# Patient Record
Sex: MALE | Race: WHITE | NOT HISPANIC OR LATINO | Employment: OTHER | URBAN - METROPOLITAN AREA
[De-identification: names, ages, dates, MRNs, and addresses within clinical notes are randomized per-mention and may not be internally consistent; named-entity substitution may affect disease eponyms.]

---

## 2017-06-30 LAB — HCV AB SER-ACNC: <0.1

## 2023-11-20 LAB — HCV AB SER-ACNC: NORMAL

## 2023-12-01 ENCOUNTER — TELEPHONE (OUTPATIENT)
Age: 73
End: 2023-12-01

## 2023-12-01 NOTE — TELEPHONE ENCOUNTER
Patients GI provider:  SHERYL Dias    Number to return call: [phone]     Reason for call: Pt called and wants to email some records to [facility] for his upcoming appointment.  Advice if he could fax it over but he said he can only doing by email please review and reach out to him to see if he can email it to the office thank you     Scheduled procedure/appointment date if applicable: Appt 66/70/0967

## 2023-12-01 NOTE — TELEPHONE ENCOUNTER
Spoke with Tj Enrique. Unfortunately, we are not able to receive records via E-mail. He is going to attempt to send them Via [employer] as a non-urgent medical Question with the records attached. If unable, he will bring them to his visit on 12-6-23. I also let him know he could contact the previous medical professional(s) to fax their records to us.  ~BS

## 2023-12-04 PROBLEM — B01.9 CHICKEN POX: Status: ACTIVE | Noted: 2023-12-04

## 2023-12-04 PROBLEM — Z11.59 ENCOUNTER FOR HEPATITIS C SCREENING TEST FOR LOW RISK PATIENT: Status: ACTIVE | Noted: 2023-12-04

## 2023-12-04 PROBLEM — Z86.010 PERSONAL HISTORY OF COLONIC POLYPS: Status: ACTIVE | Noted: 2023-12-04

## 2023-12-04 PROBLEM — G43.109 OCULAR MIGRAINE: Status: ACTIVE | Noted: 2023-12-04

## 2023-12-04 PROBLEM — I44.30 AV BLOCK: Status: ACTIVE | Noted: 2023-12-04

## 2023-12-04 PROBLEM — Z86.0100 PERSONAL HISTORY OF COLONIC POLYPS: Status: ACTIVE | Noted: 2023-12-04

## 2023-12-04 PROBLEM — Z95.0 CARDIAC PACEMAKER IN SITU: Status: ACTIVE | Noted: 2023-12-04

## 2023-12-05 ENCOUNTER — OFFICE VISIT (OUTPATIENT)
Dept: GASTROENTEROLOGY | Facility: CLINIC | Age: 73
End: 2023-12-05
Payer: COMMERCIAL

## 2023-12-05 VITALS
BODY MASS INDEX: 23.34 KG/M2 | SYSTOLIC BLOOD PRESSURE: 114 MMHG | HEIGHT: 68 IN | DIASTOLIC BLOOD PRESSURE: 76 MMHG | WEIGHT: 154 LBS

## 2023-12-05 DIAGNOSIS — Z12.11 COLON CANCER SCREENING: ICD-10-CM

## 2023-12-05 DIAGNOSIS — R19.7 DIARRHEA, UNSPECIFIED TYPE: Primary | ICD-10-CM

## 2023-12-05 PROCEDURE — 99203 OFFICE O/P NEW LOW 30 MIN: CPT | Performed by: PHYSICIAN ASSISTANT

## 2023-12-05 RX ORDER — BACITRACIN 500 UNIT/G
OINTMENT (GRAM) TOPICAL EVERY 12 HOURS
COMMUNITY

## 2023-12-05 RX ORDER — DIPHENHYDRAMINE HYDROCHLORIDE 25 MG/1
TABLET ORAL EVERY 12 HOURS
COMMUNITY

## 2023-12-05 RX ORDER — ALBUTEROL SULFATE 90 UG/1
2 AEROSOL, METERED RESPIRATORY (INHALATION) EVERY 6 HOURS PRN
COMMUNITY

## 2023-12-05 NOTE — PROGRESS NOTES
[address] [place] [address] [facility] New Patient Initial Visit  Collins Dupree 75 y.o. male MRN: [id number]  Encounter: [id number]    ASSESSMENT AND PLAN:    1. Diarrhea, unspecified type  Acute, improved  OK to slowly advanced diet to baseline high fiber diet - handout provided  Please call the office or schedule an appointment if symptoms worsen or fail to improve. 2. Colon cancer screening  Last colonoscopy: 1/2019  recall: 10 yr(s)  due: 1/2029     Return if symptoms worsen or fail to improve. Chief Complaint   Patient presents with    Diarrhea       HPI:   Accompanied by self and history is obtained from self. Collins Dupree is a 75y.o. year old male with a PMH significant for colon polyps, AV block s/p pacemaker who presents as a new patient for initial evaluation of diarrhea. HPI    Diarrhea  Ongoing x1 month  Started with runny, light brown stools 11/6  Tried imodium, bland diet without meaningful relief  Sometimes takes probiotic  -15 lb wt loss x1 month  If last meal 13:00, diarrhea will end before midnight  Assoc with nausea 1-3 hrs prior to loose stool  Had vomiting x1 on 11/28  Stool studies ordered by PCP were negative    Last loose stool 11/28  Last BM was last night - small volume, soft, brown  Has gained 6.5 lbs back since last loose stool  No nausea, vomiting, bloating since 11/28    GI History:  Previous issues: None  Blood thinners: ASA: no antiplatelet: no anticoagulation: no  NSAID use: 200 mg once/wk at most  Caffeine use: 1 cup coffee/day, stopped during diarrhea  Tobacco use: 0.25 PPD x10 yrs  EtOH use: quit in 30s, no hx/o heavy use  Cannabis use: none  Insulin use: no    Abdominal Surgical Hx: appendectomy (1981)  Family Hx: Denies first degree relatives with GI malignancies. GI procedure Hx:  EGD:   Colonoscopy: 1/2019, normal; 10 yr recall  GI imaging Hx: CT A/P 11/29/2023: "IMPRESSION:  1.   Possible colitis involving the sigmoid region however detailed evaluation is limited due to lack of distention and adequate evaluation of mural thickness. 2.  Other findings of acute inflammation or abnormal fluid collections. 3.  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 Negative for appetite change, chills and fever. HENT:  Negative for dental problem, mouth sores, sore throat, trouble swallowing and voice change. Respiratory:  Negative for cough and choking. Cardiovascular:  Negative for chest pain and leg swelling. Gastrointestinal:  Positive for abdominal pain, diarrhea, nausea and vomiting. Negative for abdominal distention, anal bleeding, blood in stool, constipation and rectal pain. Genitourinary:  Negative for dysuria, frequency and hematuria. Musculoskeletal:  Negative for arthralgias and myalgias. Skin:  Negative for pallor and rash. Neurological:  Negative for weakness, light-headedness and headaches. Psychiatric/Behavioral:  Negative for confusion and sleep disturbance. The patient is not nervous/anxious. Historical Information   Past Medical History:   Diagnosis Date    AV block 2018    [facility], Karla Martinez [facility]    CAD (coronary artery disease)     Cardiac pacemaker in situ 02/2018    Cecal volvulus (HCC)     Chicken pox 1955    Elevated LFTs     Encounter for hepatitis C screening test for low risk patient 2017    negative    Hyperlipemia     Ocular migraine 2000    intermittent    Personal history of colonic polyps 2001    [facility]    S/P CABG (coronary artery bypass graft)      Past Surgical History:   Procedure Laterality Date    APPENDECTOMY  1981    appendicitis, intermittent cecal volvulus    CARDIAC CATHETERIZATION  05/2019    LAD 80-90% stenosis, Allan Elizabeth    COLONOSCOPY  2019    normal    [facility] TONSILLECTOMY  1954    VASECTOMY  45     Social History     Substance and Sexual Activity   Alcohol Use None     Social History     Substance and Sexual Activity   Drug Use Not on file     Social History     Tobacco Use   Smoking Status Not on file   Smokeless Tobacco Not on file     Family History   Problem Relation Age of Onset    Heart disease Mother 70        bypass x3    Dementia Mother 67    Heart failure Mother 80    Alcohol abuse Father     Stroke Father 68        smoker    Hypertension Sister     Psoriatic Arthritis Sister     Heart disease Sister 67        LAD, RCA, iliac artery stents    Osteogenesis imperfecta Sister     Heart attack Sister 43        again at 46    Heart disease Sister         coronary artery stent    Multiple sclerosis Sister 39    Colon polyps Neg Hx     Colon cancer Neg Hx        Meds/Allergies     Current Outpatient Medications:     B Complex Vitamins (VITAMIN B COMPLEX 100 IJ)    Biotin (Biotin 5000) 5 MG CAPS    COCONUT OIL PO    Misc Natural Products (GLUCOSAMINE CHOND COMPLEX/MSM PO)    Multiple Vitamin (MULTI VITAMIN DAILY PO)    OMEGA 3-6-9 FATTY ACIDS PO    Saw [place] 160 MG CAPS    albuterol (PROVENTIL HFA,VENTOLIN HFA) 90 mcg/act inhaler    No Known Allergies    PHYSICAL EXAM:    Blood pressure 114/76, height 5' 8" (1.727 m), weight 69.9 kg (154 lb). Body mass index is 23.42 kg/m². Physical Exam  Vitals and nursing note reviewed. Constitutional:       General: He is not in acute distress. Appearance: He is not toxic-appearing. HENT:      Head: Normocephalic and atraumatic. Mouth/Throat:      Mouth: Mucous membranes are moist.      Pharynx: Oropharynx is clear. Eyes:      General: No scleral icterus. Extraocular Movements: Extraocular movements intact. Neck:      Trachea: Phonation normal.   Cardiovascular:      Rate and Rhythm: Normal rate and regular rhythm. Heart sounds: No murmur heard. No friction rub. No gallop.    Pulmonary: Effort: Pulmonary effort is normal. No respiratory distress. Breath sounds: No wheezing, rhonchi or rales. Abdominal:      General: Bowel sounds are normal. There is no distension or abdominal bruit. Palpations: Abdomen is soft. There is no hepatomegaly or splenomegaly. Tenderness: There is no abdominal tenderness. There is no guarding or rebound. Musculoskeletal:      Cervical back: Neck supple. Comments: Moving all 4 extremities spontaneously   Skin:     General: Skin is warm and dry. Capillary Refill: Capillary refill takes less than 2 seconds. Coloration: Skin is not jaundiced or pale. Neurological:      General: No focal deficit present. Mental Status: He is alert and oriented to person, place, and time. Psychiatric:         Behavior: Behavior normal. Behavior is cooperative. Thought Content: Thought content normal.         Lab Results:   No results found. Radiology Results:   No results found. Patient expressed understanding and had all questions and concerns addressed. Fina Padilla PA-C  12/05/23  10:16 AM    This chart was completed in part utilizing [employer] speech voice recognition software. Random word insertions, pronoun errors, and incomplete sentences are an occasional consequence of this system due to software limitations, and ambient noise. Any questions or concerns about the content, text, or information contained within the body of this dictation should be directly addressed to the provider for clarification.

## 2023-12-18 ENCOUNTER — TELEPHONE (OUTPATIENT)
Dept: GASTROENTEROLOGY | Facility: CLINIC | Age: 73
End: 2023-12-18

## 2023-12-18 ENCOUNTER — NURSE TRIAGE (OUTPATIENT)
Age: 73
End: 2023-12-18

## 2023-12-18 DIAGNOSIS — R19.7 DIARRHEA, UNSPECIFIED TYPE: Primary | ICD-10-CM

## 2023-12-18 RX ORDER — POLYETHYLENE GLYCOL 3350 17 G/17G
289 POWDER, FOR SOLUTION ORAL ONCE
Qty: 289 G | Refills: 0 | Status: SHIPPED | OUTPATIENT
Start: 2023-12-18 | End: 2023-12-18

## 2023-12-18 RX ORDER — BISACODYL 5 MG/1
TABLET, DELAYED RELEASE ORAL
Qty: 4 TABLET | Refills: 0 | Status: SHIPPED | OUTPATIENT
Start: 2023-12-18

## 2023-12-18 NOTE — TELEPHONE ENCOUNTER
Scheduled date of combo (as of today): 12/21/2023  Physician performing combo:dr Longoria  Location of combo:[facility]  Bowel prep: Miralax/Dulcolax  Instructions emailed to patient by: xuan  Clearances: none     Patient had pacemaker 2018 by Dr Lawrence Sims

## 2023-12-18 NOTE — TELEPHONE ENCOUNTER
EGD + colonoscopy ordered, please assist with scheduling.    Plan for Miralax + dulcolax prep (Rx sent to pharmacy) as Clenpiq unlikely to be covered by pt's insurance carrier. Please send prep instructions to patient.    Thanks!

## 2023-12-18 NOTE — TELEPHONE ENCOUNTER
Regarding: Diarrhea and Vomiting  ----- Message from Becky George sent at 12/18/2023  8:18 AM EST -----  Pt requesting to speak to someone in regards to his symptoms. Pt is having diarrhea and is vomiting. It has been on going and would like to know what the next step is.

## 2023-12-18 NOTE — TELEPHONE ENCOUNTER
"Reason for Disposition  • Information only question and nurse able to answer    Answer Assessment - Initial Assessment Questions  1. REASON FOR CALL or QUESTION: \"What is your reason for calling today?\" or \"How can I best help you?\" or \"What question do you have that I can help answer?\"        PT COMMUNICATED WITH PROVIDER VIA [place], PT WOULD LIKE TO PROCEED WITH SCHEDULING EGD/COLONOSCOPY.    Protocols used: Information Only Call - No Triage-[place]ULT-[place]    "

## 2023-12-19 ENCOUNTER — ANESTHESIA EVENT (OUTPATIENT)
Dept: ANESTHESIOLOGY | Facility: AMBULATORY SURGERY CENTER | Age: 73
End: 2023-12-19

## 2023-12-19 ENCOUNTER — ANESTHESIA (OUTPATIENT)
Dept: ANESTHESIOLOGY | Facility: AMBULATORY SURGERY CENTER | Age: 73
End: 2023-12-19

## 2023-12-19 PROBLEM — I25.5 ISCHEMIC CARDIOMYOPATHY: Status: ACTIVE | Noted: 2023-12-19

## 2023-12-19 NOTE — TELEPHONE ENCOUNTER
JANEL-Spoke to Gela at [facility]. Patients records were obtained and reviewed by anesthesia. Per review patient is to be scheduled at hospital.

## 2023-12-19 NOTE — TELEPHONE ENCOUNTER
Scheduled date of combo (as of today):01/16/2024  Physician performing combo:Dr Garcia  Location of combo: [facility]  Bowel prep reviewed with patient:Miralax/dulcolax  Instructions emailed to patient by: xuan  Clearances: Pacemaker

## 2023-12-28 ENCOUNTER — TELEPHONE (OUTPATIENT)
Dept: GASTROENTEROLOGY | Facility: CLINIC | Age: 73
End: 2023-12-28

## 2023-12-28 DIAGNOSIS — R11.14 BILIOUS VOMITING WITH NAUSEA: ICD-10-CM

## 2023-12-28 DIAGNOSIS — R19.7 DIARRHEA, UNSPECIFIED TYPE: Primary | ICD-10-CM

## 2024-01-03 ENCOUNTER — TELEPHONE (OUTPATIENT)
Dept: GASTROENTEROLOGY | Facility: CLINIC | Age: 74
End: 2024-01-03

## 2024-01-09 ENCOUNTER — NURSE TRIAGE (OUTPATIENT)
Age: 74
End: 2024-01-09

## 2024-01-09 NOTE — TELEPHONE ENCOUNTER
"PT CALLED REQUESTING RESPONSE TO HIS [employer] MESSAGE.           Reason for Disposition   Information only question and nurse able to answer    Answer Assessment - Initial Assessment Questions  1. REASON FOR CALL or QUESTION: \"What is your reason for calling today?\" or \"How can I best help you?\" or \"What question do you have that I can help answer?\"      PT REQUESTING RESPONSE TO [employer] MESSAGE    Protocols used: Information Only Call - No Triage-ADULT-[place]    "

## 2024-01-17 ENCOUNTER — TELEPHONE (OUTPATIENT)
Age: 74
End: 2024-01-17

## 2024-01-17 NOTE — TELEPHONE ENCOUNTER
Patients GI provider:  Dr. Means    Number to return call: [phone]    Reason for call: Pt called to schedule his repeat colonoscopy with a 2 day prep at [facility] please reach out thank you   Scheduled procedure/appointment date if applicable: n/a

## 2024-01-18 ENCOUNTER — TELEPHONE (OUTPATIENT)
Dept: GASTROENTEROLOGY | Facility: CLINIC | Age: 74
End: 2024-01-18

## 2024-01-18 DIAGNOSIS — R19.7 DIARRHEA, UNSPECIFIED TYPE: Primary | ICD-10-CM

## 2024-01-18 DIAGNOSIS — Z12.11 COLON CANCER SCREENING: Primary | ICD-10-CM

## 2024-01-18 NOTE — TELEPHONE ENCOUNTER
Scheduled date of colonoscopy (as of today):01/30/2024  Physician performing colonoscopy: Dr Henderson  Location of colonoscopy: [facility]  Bowel prep:2 Day [place]  Instructions emailed to patient by: xuan  Clearances: Pacemaker clearance    Patient needs prescription sent to [facility]

## 2024-01-23 ENCOUNTER — TELEPHONE (OUTPATIENT)
Dept: GASTROENTEROLOGY | Facility: CLINIC | Age: 74
End: 2024-01-23

## 2024-02-14 ENCOUNTER — TELEPHONE (OUTPATIENT)
Dept: GASTROENTEROLOGY | Facility: CLINIC | Age: 74
End: 2024-02-14

## 2024-02-14 NOTE — TELEPHONE ENCOUNTER
Per Dr Jenkins-pt is to have cologuard done  F/u colon [facility] 02/13/2024

## 2025-08-04 ENCOUNTER — TELEPHONE (OUTPATIENT)
Age: 75
End: 2025-08-04

## 2025-08-04 DIAGNOSIS — I25.10 CORONARY ARTERY DISEASE INVOLVING NATIVE CORONARY ARTERY OF NATIVE HEART WITHOUT ANGINA PECTORIS: ICD-10-CM

## 2025-08-04 DIAGNOSIS — I25.5 ISCHEMIC CARDIOMYOPATHY: Primary | ICD-10-CM

## 2025-08-04 DIAGNOSIS — Z12.5 SCREENING FOR PROSTATE CANCER: ICD-10-CM

## 2025-08-08 PROBLEM — J45.990 EXERCISE INDUCED BRONCHOSPASM: Status: ACTIVE | Noted: 2025-08-08

## 2025-08-08 PROBLEM — E78.5 HYPERLIPIDEMIA: Status: ACTIVE | Noted: 2025-08-08

## 2025-08-08 PROBLEM — I25.10 CORONARY ARTERY DISEASE: Status: ACTIVE | Noted: 2025-08-08

## 2025-08-08 PROBLEM — I51.89 HYPOKINESIA OF LEFT VENTRICLE: Status: ACTIVE | Noted: 2025-08-08

## 2025-08-08 PROBLEM — I44.1 SECOND DEGREE ATRIOVENTRICULAR BLOCK: Status: ACTIVE | Noted: 2025-08-08

## 2025-08-08 PROBLEM — Z95.1 PRESENCE OF AORTOCORONARY BYPASS GRAFT: Status: ACTIVE | Noted: 2025-08-08
